# Patient Record
(demographics unavailable — no encounter records)

---

## 2025-06-29 NOTE — REASON FOR VISIT
[FreeTextEntry2] : neck and back and right shoulder pain RHD This is a 41-year-old gentleman recently retired from [place] [employer] May 30, 2025 with 19 years of service He had several injuries during his career resulting in having several surgeries. Of note 2023 while executing a search warrant with narcotics fell down a flight of stairs (14 ).  As result has right shoulder surgery by Dr. Villa with a biceps tenodesis he continued to struggle with that and sought another opinion had a second injection which only helped about 3 weeks thus additional arthroscopic surgery was advised He had pain and injury to both his neck and back and right hip he has had several injections to the back and to the right hip and is having a right hip replacement July 18, 2025 He had a lumbar sacral fusion L4-5 December 2023 by Dr. Denis pain got worse at which point the hip was identified to be arthritic.  In January of this year he had cervical spine surgery by Dr. Denis C6-7 fusion now noting restricted motion in the neck and right arm is still.  His pain at rest is 2 with activity 8.  He does limp.  His endurance sitting with standing is 20 minutes he can walk no more than a block He does not use a cane.  He sees pain management Dr. Peter did provide recent x-rays He is on meloxicam and pregabalin had stopped duloxetine no drug allergies does not smoke

## 2025-06-29 NOTE — IMAGING
[de-identified] : Pleasant well-developed in some discomfort easy to examine Cervical spine good position healed incision moderate restrictions in rotation flexion extension no pain with compression Right shoulder guarded motion impingement positive Stephens sign dysesthesia throughout the right arm  Limited rotation internal/external right more than left moderate pain on right shoulder forced adduction Good  strength both upper extremities Lumbar spine no scoliosis spasm stiff pain with extension limits in flexion extension tight dorsal lumbar fascia and hamstrings mild limp to the right Right hip restrictions in rotation Both knees good motion mild patellofemoral crepitus no atrophy reflexes both lower extremities depressed but symmetric  X-rays reviewed from recent time include: Right shoulder 6/26/2025 AC joint arthritis moderate glenohumeral arthritis deteriorated from recent previous exam impingement lesion greater tuberosity X-ray lumbar spine 6/26/2025: Prior instrumented fusion L4-5 unchanged, moderate degenerative changes L1 to and severe right and mild left hip arthritis X-ray cervical spine 6/26/2025: Postoperative appearance of ACDF C6-7 X-rays SI joints 4/2/2025: L4-5 posterior and interbody fusion SI joints appear symmetric with no significant arthrosis right hip arthritis appears advanced MRI lumbar spine 6/25/2025: Interval fusion L4-5 with interposed interbody spacer, broad-based spondylitic ridging and mild bilateral neuroforaminal narrowing is seen at the level and central annular tear at L5-S1

## 2025-06-29 NOTE — REASON FOR VISIT
[FreeTextEntry2] : neck and back and right shoulder pain RHD This is a 41-year-old gentleman recently retired from [place] [employer] May 30, 2025 with 19 years of service He had several injuries during his career resulting in having several surgeries. Of note 2023 while executing a search warrant with narcotics fell down a flight of stairs (14 ).  As result has right shoulder surgery by Dr. Villa with a biceps tenodesis he continued to struggle with that and sought another opinion had a second injection which only helped about 3 weeks thus additional arthroscopic surgery was advised He had pain and injury to both his neck and back and right hip he has had several injections to the back and to the right hip and is having a right hip replacement July 18, 2025 He had a lumbar sacral fusion L4-5 December 2023 by Dr. Denis pain got worse at which point the hip was identified to be arthritic.  In January of this year he had cervical spine surgery by Dr. Denis C6-7 fusion now noting restricted motion in the neck and right arm is still.  His pain at rest is 2 with activity 8.  He does limp.  His endurance sitting with standing is 20 minutes he can walk no more than a block He does not use a cane.  He sees pain management Dr. Peter did provide recent x-rays He is on meloxicam and pregabalin had stopped duloxetine no drug allergies does not smoke

## 2025-06-29 NOTE — IMAGING
[de-identified] : Pleasant well-developed in some discomfort easy to examine Cervical spine good position healed incision moderate restrictions in rotation flexion extension no pain with compression Right shoulder guarded motion impingement positive Stephens sign dysesthesia throughout the right arm  Limited rotation internal/external right more than left moderate pain on right shoulder forced adduction Good  strength both upper extremities Lumbar spine no scoliosis spasm stiff pain with extension limits in flexion extension tight dorsal lumbar fascia and hamstrings mild limp to the right Right hip restrictions in rotation Both knees good motion mild patellofemoral crepitus no atrophy reflexes both lower extremities depressed but symmetric  X-rays reviewed from recent time include: Right shoulder 6/26/2025 AC joint arthritis moderate glenohumeral arthritis deteriorated from recent previous exam impingement lesion greater tuberosity X-ray lumbar spine 6/26/2025: Prior instrumented fusion L4-5 unchanged, moderate degenerative changes L1 to and severe right and mild left hip arthritis X-ray cervical spine 6/26/2025: Postoperative appearance of ACDF C6-7 X-rays SI joints 4/2/2025: L4-5 posterior and interbody fusion SI joints appear symmetric with no significant arthrosis right hip arthritis appears advanced MRI lumbar spine 6/25/2025: Interval fusion L4-5 with interposed interbody spacer, broad-based spondylitic ridging and mild bilateral neuroforaminal narrowing is seen at the level and central annular tear at L5-S1

## 2025-06-29 NOTE — ASSESSMENT
[FreeTextEntry1] : Orthopedically stable at this time his medications and supervision by pain management are appropriate. I am hesitant at this time to suggest further right shoulder surgery without seeing his  operative report as well as an updated MRI Ultimately I believe he is going to require shoulder replacement whether it is regular or reverse would depend on the integrity of the rotator cuff.  He is anticipating a right hip replacement in July I suggested follow-up here in about 3 to 4 months I did requested if any other diagnostics are done previously by pain management including nerve testing I would like to see them Based on the information at hand, including his lengthy, history, current symptoms, physical findings and diagnostics available it is my opinion that he is totally disabled and unable to work